# Patient Record
Sex: FEMALE | Race: WHITE | NOT HISPANIC OR LATINO | Employment: PART TIME | URBAN - METROPOLITAN AREA
[De-identification: names, ages, dates, MRNs, and addresses within clinical notes are randomized per-mention and may not be internally consistent; named-entity substitution may affect disease eponyms.]

---

## 2024-02-20 ENCOUNTER — HOSPITAL ENCOUNTER (EMERGENCY)
Facility: HOSPITAL | Age: 39
Discharge: HOME | End: 2024-02-20

## 2024-02-20 VITALS
SYSTOLIC BLOOD PRESSURE: 139 MMHG | WEIGHT: 207 LBS | OXYGEN SATURATION: 98 % | RESPIRATION RATE: 19 BRPM | HEART RATE: 76 BPM | TEMPERATURE: 98 F | HEIGHT: 63 IN | DIASTOLIC BLOOD PRESSURE: 81 MMHG | BODY MASS INDEX: 36.68 KG/M2

## 2024-02-20 DIAGNOSIS — M79.605 LEFT LEG PAIN: Primary | ICD-10-CM

## 2024-02-20 DIAGNOSIS — R06.02 SOB (SHORTNESS OF BREATH): ICD-10-CM

## 2024-02-20 LAB
ALBUMIN SERPL BCP-MCNC: 3.9 G/DL (ref 3.5–5.2)
ALP SERPL-CCNC: 53 U/L (ref 55–135)
ALT SERPL W/O P-5'-P-CCNC: 12 U/L (ref 10–44)
ANION GAP SERPL CALC-SCNC: 14 MMOL/L (ref 8–16)
AST SERPL-CCNC: 15 U/L (ref 10–40)
BASOPHILS # BLD AUTO: 0.03 K/UL (ref 0–0.2)
BASOPHILS NFR BLD: 0.3 % (ref 0–1.9)
BILIRUB SERPL-MCNC: 0.3 MG/DL (ref 0.1–1)
BNP SERPL-MCNC: 12 PG/ML (ref 0–99)
BUN SERPL-MCNC: 11 MG/DL (ref 6–20)
CALCIUM SERPL-MCNC: 9.5 MG/DL (ref 8.7–10.5)
CHLORIDE SERPL-SCNC: 105 MMOL/L (ref 95–110)
CO2 SERPL-SCNC: 20 MMOL/L (ref 23–29)
CREAT SERPL-MCNC: 0.6 MG/DL (ref 0.5–1.4)
D DIMER PPP IA.FEU-MCNC: 0.3 MG/L FEU
DIFFERENTIAL METHOD BLD: NORMAL
EOSINOPHIL # BLD AUTO: 0.1 K/UL (ref 0–0.5)
EOSINOPHIL NFR BLD: 0.9 % (ref 0–8)
ERYTHROCYTE [DISTWIDTH] IN BLOOD BY AUTOMATED COUNT: 12.6 % (ref 11.5–14.5)
EST. GFR  (NO RACE VARIABLE): >60 ML/MIN/1.73 M^2
GLUCOSE SERPL-MCNC: 98 MG/DL (ref 70–110)
HCG SERPL QL: NEGATIVE
HCT VFR BLD AUTO: 41.3 % (ref 37–48.5)
HCV AB SERPL QL IA: NORMAL
HGB BLD-MCNC: 14 G/DL (ref 12–16)
HIV 1+2 AB+HIV1 P24 AG SERPL QL IA: NORMAL
IMM GRANULOCYTES # BLD AUTO: 0.02 K/UL (ref 0–0.04)
IMM GRANULOCYTES NFR BLD AUTO: 0.2 % (ref 0–0.5)
INR PPP: 1 (ref 0.8–1.2)
LYMPHOCYTES # BLD AUTO: 3.1 K/UL (ref 1–4.8)
LYMPHOCYTES NFR BLD: 34.7 % (ref 18–48)
MCH RBC QN AUTO: 29.6 PG (ref 27–31)
MCHC RBC AUTO-ENTMCNC: 33.9 G/DL (ref 32–36)
MCV RBC AUTO: 87 FL (ref 82–98)
MONOCYTES # BLD AUTO: 0.4 K/UL (ref 0.3–1)
MONOCYTES NFR BLD: 4.4 % (ref 4–15)
NEUTROPHILS # BLD AUTO: 5.4 K/UL (ref 1.8–7.7)
NEUTROPHILS NFR BLD: 59.5 % (ref 38–73)
NRBC BLD-RTO: 0 /100 WBC
PLATELET # BLD AUTO: 308 K/UL (ref 150–450)
PMV BLD AUTO: 9.8 FL (ref 9.2–12.9)
POTASSIUM SERPL-SCNC: 4.2 MMOL/L (ref 3.5–5.1)
PROT SERPL-MCNC: 7.8 G/DL (ref 6–8.4)
PROTHROMBIN TIME: 10.2 SEC (ref 9–12.5)
RBC # BLD AUTO: 4.73 M/UL (ref 4–5.4)
SODIUM SERPL-SCNC: 139 MMOL/L (ref 136–145)
TROPONIN I SERPL DL<=0.01 NG/ML-MCNC: <0.006 NG/ML (ref 0–0.03)
WBC # BLD AUTO: 9.04 K/UL (ref 3.9–12.7)

## 2024-02-20 PROCEDURE — 71045 X-RAY EXAM CHEST 1 VIEW: CPT | Mod: TC

## 2024-02-20 PROCEDURE — 83880 ASSAY OF NATRIURETIC PEPTIDE: CPT | Performed by: PHYSICIAN ASSISTANT

## 2024-02-20 PROCEDURE — 93005 ELECTROCARDIOGRAM TRACING: CPT

## 2024-02-20 PROCEDURE — 93010 ELECTROCARDIOGRAM REPORT: CPT | Mod: ,,, | Performed by: INTERNAL MEDICINE

## 2024-02-20 PROCEDURE — 86803 HEPATITIS C AB TEST: CPT | Performed by: EMERGENCY MEDICINE

## 2024-02-20 PROCEDURE — 80053 COMPREHEN METABOLIC PANEL: CPT | Performed by: PHYSICIAN ASSISTANT

## 2024-02-20 PROCEDURE — 71045 X-RAY EXAM CHEST 1 VIEW: CPT | Mod: 26,,, | Performed by: RADIOLOGY

## 2024-02-20 PROCEDURE — 85025 COMPLETE CBC W/AUTO DIFF WBC: CPT | Performed by: PHYSICIAN ASSISTANT

## 2024-02-20 PROCEDURE — 84484 ASSAY OF TROPONIN QUANT: CPT | Performed by: PHYSICIAN ASSISTANT

## 2024-02-20 PROCEDURE — 87389 HIV-1 AG W/HIV-1&-2 AB AG IA: CPT | Performed by: EMERGENCY MEDICINE

## 2024-02-20 PROCEDURE — 84703 CHORIONIC GONADOTROPIN ASSAY: CPT | Performed by: PHYSICIAN ASSISTANT

## 2024-02-20 PROCEDURE — 99285 EMERGENCY DEPT VISIT HI MDM: CPT | Mod: 25

## 2024-02-20 PROCEDURE — 85610 PROTHROMBIN TIME: CPT | Performed by: PHYSICIAN ASSISTANT

## 2024-02-20 PROCEDURE — 85379 FIBRIN DEGRADATION QUANT: CPT | Performed by: PHYSICIAN ASSISTANT

## 2024-02-20 NOTE — ED PROVIDER NOTES
"  Please note that my documentation in this Electronic Healthcare Record was produced using speech recognition software and therefore may contain errors related to that software.These could include grammar, punctuation and spelling errors or the inclusion/ exclusion of phrases that were not intended. Please contact myself for any clarification, questions or concerns.    HPI: Patient is a 38 y.o. female who presents with the chief complaint of left leg pain X 2 years.  Patient states she gets pain in her calf and thigh intermittently.  Usually has a bulging and has varicose and spider veins in the area.  Over the last week or so, she has endorse worsening pain as well as some swelling in her ankle.  States it has improved overnight.  She has had some mild shortness of breath but relates it to her anxiety.  Denying chest pain.  Denies recent travel, surgeries, hormone use, history of PE or DVT.  States she does have a couple family members who have  from clots.    REVIEW OF SYSTEMS - 10 systems were independently reviewed and are otherwise negative with the exception of those items previously documented in the HPI and nursing notes.    Allergy: Patient has no known allergies.    Past medical history:   Past Medical History:   Diagnosis Date    Anxiety disorder, unspecified     Hypertension        Surgical History: History reviewed. No pertinent surgical history.    Social history:   Social History     Socioeconomic History    Marital status: Unknown   Tobacco Use    Smoking status: Never     Passive exposure: Never    Smokeless tobacco: Never   Substance and Sexual Activity    Alcohol use: Not Currently    Drug use: Not Currently     Types: Marijuana     Comment: sometimes    Sexual activity: Yes     Partners: Male       Family history: non-contributory    EHR: reviewed    Vitals: /81 (BP Location: Left arm, Patient Position: Sitting)   Pulse 75   Temp 98.1 °F (36.7 °C) (Oral)   Resp 18   Ht 5' 3" (1.6 " m)   Wt 93.9 kg (207 lb)   LMP 01/30/2024   SpO2 98%   Breastfeeding No   BMI 36.67 kg/m²     PHYSICAL EXAM:    General-38-year-old female awake and alert, oriented, GCS 15, in no acute distress,  HEENT- normocephalic, atraumatic, sclera anicteric, moist mucous membranes,   CARDIOVASCULAR- regular rate and rhythm, no murmurs/rubs,/gallops, normal S1-S2  PULMONARY- nonlabored, no respiratory distress, clear to auscultation bilaterally, no wheezes/rhonchi/rales, chest expansion symmetrical  NEUROLOGIC- mental status normal, speech fluid, cognition normal, CN II-XII grossly intact, ambulatory with proper gait.  MUSCULOSKELETAL- well-nourished, well-developed, full range of motion joints of the bilateral lower extremities, tenderness over the calf and lower part of the hamstring.  No swelling.  No discoloration.  Peripheral pulse 2 +/4, sensation is equal and normal.  DERMATOLOGIC- warm and dry, no visible rashes  PSYCHIATRIC- normal affect, normal concentration           Labs Reviewed   COMPREHENSIVE METABOLIC PANEL - Abnormal; Notable for the following components:       Result Value    CO2 20 (*)     Alkaline Phosphatase 53 (*)     All other components within normal limits    Narrative:     Release to patient->Immediate   CBC W/ AUTO DIFFERENTIAL    Narrative:     Release to patient->Immediate   B-TYPE NATRIURETIC PEPTIDE    Narrative:     Release to patient->Immediate   TROPONIN I    Narrative:     Release to patient->Immediate   D DIMER, QUANTITATIVE    Narrative:     Release to patient->Immediate   PROTIME-INR    Narrative:     Release to patient->Immediate   PREGNANCY TEST SCREENING, SERUM   HIV 1 / 2 ANTIBODY   HEPATITIS C ANTIBODY       X-Ray Chest AP Portable   Final Result      No acute chest disease.         Electronically signed by: Devan Durham   Date:    02/20/2024   Time:    10:57      EKG 02/20/2024 11:15 a.m..  Interpreted by me as normal sinus rhythm.  66 beats per minute.  Intervals otherwise  normal.  Normal axis deviation.  No ST segment changes or T-wave inversions noted.    MEDICAL DECISION MAKING: Patient is a 38 y.o. female who presented with chief complaint of intermittent left lower leg pain with some swelling.  Also having some shortness of breath.  Denying chest pain, cough, congestion, fever, falls or trauma.  On examination, there is no appreciable swelling or discoloration noted.  She does have some tenderness in the areas.  Good peripheral pulses and sensation.  Differentials considered, muscular sprain/strain, DVT, gout, varicose veins, etc..  Labs are completely normal.  Cardiac workup was also normal.  I have very low suspicion for DVT at this time.  Patient advised to follow up with primary care he will wear compression, elevate the legs, take over-the-counter medications as needed.  Patient's vitals are stable and normal.  They will be discharged home in stable condition.  They verbalized their understanding and agreed to this plan.    CLINICAL IMPRESSION:  1. Left leg pain         Clarisa Garcia PA  02/20/24 [id number]

## 2024-02-20 NOTE — DISCHARGE INSTRUCTIONS
Take over-the-counter ibuprofen or Tylenol as needed for pain.  Apply a heating pad to the affected area.  Wear compression socks and keep her legs elevated to decrease pain and swelling.. Return for any worsening or new symptoms. Follow up with Primary Care Provider in the next 2-3 days.

## 2024-02-20 NOTE — Clinical Note
"Charisma Funez" John was seen and treated in our emergency department on 2/20/2024.  She may return to school on 02/21/2024.      If you have any questions or concerns, please don't hesitate to call.       RN"

## 2024-02-21 LAB
OHS QRS DURATION: 82 MS
OHS QTC CALCULATION: 392 MS